# Patient Record
Sex: FEMALE | Race: WHITE | NOT HISPANIC OR LATINO | Employment: OTHER | URBAN - METROPOLITAN AREA
[De-identification: names, ages, dates, MRNs, and addresses within clinical notes are randomized per-mention and may not be internally consistent; named-entity substitution may affect disease eponyms.]

---

## 2020-07-31 PROCEDURE — U0003 INFECTIOUS AGENT DETECTION BY NUCLEIC ACID (DNA OR RNA); SEVERE ACUTE RESPIRATORY SYNDROME CORONAVIRUS 2 (SARS-COV-2) (CORONAVIRUS DISEASE [COVID-19]), AMPLIFIED PROBE TECHNIQUE, MAKING USE OF HIGH THROUGHPUT TECHNOLOGIES AS DESCRIBED BY CMS-2020-01-R: HCPCS | Performed by: PERSONAL EMERGENCY RESPONSE ATTENDANT

## 2020-08-03 ENCOUNTER — TELEPHONE (OUTPATIENT)
Dept: URGENT CARE | Facility: CLINIC | Age: 25
End: 2020-08-03

## 2021-02-13 ENCOUNTER — APPOINTMENT (OUTPATIENT)
Dept: ULTRASOUND IMAGING | Facility: HOSPITAL | Age: 26
End: 2021-02-13

## 2021-02-13 ENCOUNTER — HOSPITAL ENCOUNTER (EMERGENCY)
Facility: HOSPITAL | Age: 26
Discharge: HOME | End: 2021-02-13
Attending: EMERGENCY MEDICINE | Admitting: EMERGENCY MEDICINE

## 2021-02-13 ENCOUNTER — APPOINTMENT (OUTPATIENT)
Dept: CT IMAGING | Facility: HOSPITAL | Age: 26
End: 2021-02-13

## 2021-02-13 VITALS
SYSTOLIC BLOOD PRESSURE: 102 MMHG | HEART RATE: 66 BPM | RESPIRATION RATE: 16 BRPM | HEIGHT: 65 IN | WEIGHT: 123 LBS | OXYGEN SATURATION: 100 % | BODY MASS INDEX: 20.49 KG/M2 | TEMPERATURE: 98.8 F | DIASTOLIC BLOOD PRESSURE: 64 MMHG

## 2021-02-13 DIAGNOSIS — R10.2 PELVIC PAIN: Primary | ICD-10-CM

## 2021-02-13 DIAGNOSIS — R10.84 GENERALIZED POSTPRANDIAL ABDOMINAL PAIN: ICD-10-CM

## 2021-02-13 DIAGNOSIS — R91.8 LUNG NODULES: ICD-10-CM

## 2021-02-13 LAB
ALBUMIN SERPL-MCNC: 5.1 G/DL (ref 3.5–5.2)
ALBUMIN/GLOB SERPL: 2 G/DL
ALP SERPL-CCNC: 65 U/L (ref 39–117)
ALT SERPL W P-5'-P-CCNC: 9 U/L (ref 1–33)
ANION GAP SERPL CALCULATED.3IONS-SCNC: 7 MMOL/L (ref 5–15)
AST SERPL-CCNC: 17 U/L (ref 1–32)
B-HCG UR QL: NEGATIVE
BASOPHILS # BLD AUTO: 0.07 10*3/MM3 (ref 0–0.2)
BASOPHILS NFR BLD AUTO: 1.2 % (ref 0–1.5)
BILIRUB SERPL-MCNC: 0.5 MG/DL (ref 0–1.2)
BILIRUB UR QL STRIP: NEGATIVE
BUN SERPL-MCNC: 13 MG/DL (ref 6–20)
BUN/CREAT SERPL: 15.3 (ref 7–25)
CALCIUM SPEC-SCNC: 9.3 MG/DL (ref 8.6–10.5)
CHLORIDE SERPL-SCNC: 103 MMOL/L (ref 98–107)
CLARITY UR: CLEAR
CO2 SERPL-SCNC: 28 MMOL/L (ref 22–29)
COLOR UR: YELLOW
CREAT SERPL-MCNC: 0.85 MG/DL (ref 0.57–1)
DEPRECATED RDW RBC AUTO: 40.2 FL (ref 37–54)
EOSINOPHIL # BLD AUTO: 0.26 10*3/MM3 (ref 0–0.4)
EOSINOPHIL NFR BLD AUTO: 4.3 % (ref 0.3–6.2)
ERYTHROCYTE [DISTWIDTH] IN BLOOD BY AUTOMATED COUNT: 11.8 % (ref 12.3–15.4)
GFR SERPL CREATININE-BSD FRML MDRD: 83 ML/MIN/1.73
GLOBULIN UR ELPH-MCNC: 2.5 GM/DL
GLUCOSE SERPL-MCNC: 94 MG/DL (ref 65–99)
GLUCOSE UR STRIP-MCNC: NEGATIVE MG/DL
HCT VFR BLD AUTO: 42.3 % (ref 34–46.6)
HGB BLD-MCNC: 13.8 G/DL (ref 12–15.9)
HGB UR QL STRIP.AUTO: NEGATIVE
HOLD SPECIMEN: NORMAL
IMM GRANULOCYTES # BLD AUTO: 0.01 10*3/MM3 (ref 0–0.05)
IMM GRANULOCYTES NFR BLD AUTO: 0.2 % (ref 0–0.5)
INTERNAL NEGATIVE CONTROL: NEGATIVE
INTERNAL POSITIVE CONTROL: POSITIVE
KETONES UR QL STRIP: NEGATIVE
LEUKOCYTE ESTERASE UR QL STRIP.AUTO: NEGATIVE
LIPASE SERPL-CCNC: 19 U/L (ref 13–60)
LYMPHOCYTES # BLD AUTO: 2 10*3/MM3 (ref 0.7–3.1)
LYMPHOCYTES NFR BLD AUTO: 33.1 % (ref 19.6–45.3)
Lab: NORMAL
MCH RBC QN AUTO: 30.3 PG (ref 26.6–33)
MCHC RBC AUTO-ENTMCNC: 32.6 G/DL (ref 31.5–35.7)
MCV RBC AUTO: 93 FL (ref 79–97)
MONOCYTES # BLD AUTO: 0.61 10*3/MM3 (ref 0.1–0.9)
MONOCYTES NFR BLD AUTO: 10.1 % (ref 5–12)
NEUTROPHILS NFR BLD AUTO: 3.1 10*3/MM3 (ref 1.7–7)
NEUTROPHILS NFR BLD AUTO: 51.1 % (ref 42.7–76)
NITRITE UR QL STRIP: NEGATIVE
NRBC BLD AUTO-RTO: 0 /100 WBC (ref 0–0.2)
PH UR STRIP.AUTO: 6.5 [PH] (ref 5–8)
PLATELET # BLD AUTO: 273 10*3/MM3 (ref 140–450)
PMV BLD AUTO: 10.4 FL (ref 6–12)
POTASSIUM SERPL-SCNC: 3.8 MMOL/L (ref 3.5–5.2)
PROT SERPL-MCNC: 7.6 G/DL (ref 6–8.5)
PROT UR QL STRIP: NEGATIVE
RBC # BLD AUTO: 4.55 10*6/MM3 (ref 3.77–5.28)
SODIUM SERPL-SCNC: 138 MMOL/L (ref 136–145)
SP GR UR STRIP: 1.01 (ref 1–1.03)
UROBILINOGEN UR QL STRIP: NORMAL
WBC # BLD AUTO: 6.05 10*3/MM3 (ref 3.4–10.8)
WHOLE BLOOD HOLD SPECIMEN: NORMAL
WHOLE BLOOD HOLD SPECIMEN: NORMAL

## 2021-02-13 PROCEDURE — 81003 URINALYSIS AUTO W/O SCOPE: CPT

## 2021-02-13 PROCEDURE — 74176 CT ABD & PELVIS W/O CONTRAST: CPT

## 2021-02-13 PROCEDURE — 81025 URINE PREGNANCY TEST: CPT | Performed by: EMERGENCY MEDICINE

## 2021-02-13 PROCEDURE — 80053 COMPREHEN METABOLIC PANEL: CPT

## 2021-02-13 PROCEDURE — 85025 COMPLETE CBC W/AUTO DIFF WBC: CPT

## 2021-02-13 PROCEDURE — 99283 EMERGENCY DEPT VISIT LOW MDM: CPT

## 2021-02-13 PROCEDURE — 81025 URINE PREGNANCY TEST: CPT

## 2021-02-13 PROCEDURE — 83690 ASSAY OF LIPASE: CPT

## 2021-02-13 RX ORDER — SODIUM CHLORIDE 9 MG/ML
10 INJECTION INTRAVENOUS AS NEEDED
Status: DISCONTINUED | OUTPATIENT
Start: 2021-02-13 | End: 2021-02-13 | Stop reason: HOSPADM

## 2021-02-13 NOTE — ED PROVIDER NOTES
Subjective   Ms. Jha is a 24 yo female who presents with two days of pelvic pain. She woke up with severe pain both groins yesterday with quick movement of pain to the right groin. The pain was bad enough to double her over, get a little sweaty, faint, and nauseated. The severe pain subsided quickly but ever since she has been having waxing and waning pain in her lower abdomen. She is a virgin, has no history of ovarian cysts, and is currently mid-cycle. She has not had a pain like this before. She hasn't taken anything for it. It is worse with straining to move bowels and she has pain with urination. In addition, for the last 1-2 weeks she has been having higher abdominal pain that she describes as burning. It tends to flare when she eats something. Although she has had GI issues previously, she has found that when she eats a good diet, she has little in the way of GI problems.      History provided by:  Patient      Review of Systems   Constitutional: Negative.  Negative for fever.   HENT: Negative.    Respiratory: Negative.  Negative for shortness of breath.    Cardiovascular: Negative.  Negative for chest pain.   Gastrointestinal: Positive for abdominal pain. Negative for rectal pain.   Genitourinary: Positive for pelvic pain. Negative for menstrual problem.   Musculoskeletal: Positive for back pain.   Neurological: Negative.    Psychiatric/Behavioral: Negative.    All other systems reviewed and are negative.      History reviewed. No pertinent past medical history.    Allergies   Allergen Reactions   • Acetaminophen Nausea And Vomiting   • Aspirin Hives       History reviewed. No pertinent surgical history.    History reviewed. No pertinent family history.    Social History     Socioeconomic History   • Marital status: Single     Spouse name: Not on file   • Number of children: Not on file   • Years of education: Not on file   • Highest education level: Not on file           Objective   Physical  Exam  Vitals signs and nursing note reviewed.   Constitutional:       General: She is not in acute distress.     Appearance: She is normal weight.      Comments: Slender, pleasant, young adult female, moves easily, no outward physical distress.   HENT:      Head: Atraumatic.      Mouth/Throat:      Comments: Airway patent  Eyes:      Conjunctiva/sclera: Conjunctivae normal.   Neck:      Musculoskeletal: Neck supple.      Trachea: Phonation normal.   Cardiovascular:      Rate and Rhythm: Normal rate and regular rhythm.      Heart sounds: Normal heart sounds.   Pulmonary:      Effort: Pulmonary effort is normal. No respiratory distress.      Breath sounds: Normal breath sounds.   Abdominal:      Palpations: Abdomen is soft. There is no mass.      Tenderness: There is no guarding.      Comments: Tenderness most everywhere but not that bad. RLQ more tender than elsewhere.   Musculoskeletal:         General: Tenderness (across mid-back) present.      Right lower leg: No edema.      Left lower leg: No edema.   Skin:     General: Skin is warm and dry.   Neurological:      Mental Status: She is alert and oriented to person, place, and time.   Psychiatric:         Mood and Affect: Mood normal.         Behavior: Behavior normal.         Procedures           ED Course  ED Course as of Feb 13 2104   Sat Feb 13, 2021 1910 We discussed pelvic ultrasound which she didn't like the idea of and CT scan. CT not as good for pelvic pathology but she prefers to have an idea of gynecologic vs abdominal pathology before she leaves the ED.    [LI]   2035 Her new pelvic pain certainly could have been ovulatory with a small splash of fluid now nearly reabsorbed. She also, though, has been having a lot of what are obviously GI problems. She is OK with the workup, doesn't request any pain meds, but will be referred to lung nodule clinic as well as [facility] P[facility].    [LI]      ED Course User Index  [LI] Noe Pack MD      Recent Results  (from the past 24 hour(s))   Comprehensive Metabolic Panel    Collection Time: 02/13/21  4:54 PM    Specimen: Blood   Result Value Ref Range    Glucose 94 65 - 99 mg/dL    BUN 13 6 - 20 mg/dL    Creatinine 0.85 0.57 - 1.00 mg/dL    Sodium 138 136 - 145 mmol/L    Potassium 3.8 3.5 - 5.2 mmol/L    Chloride 103 98 - 107 mmol/L    CO2 28.0 22.0 - 29.0 mmol/L    Calcium 9.3 8.6 - 10.5 mg/dL    Total Protein 7.6 6.0 - 8.5 g/dL    Albumin 5.10 3.50 - 5.20 g/dL    ALT (SGPT) 9 1 - 33 U/L    AST (SGOT) 17 1 - 32 U/L    Alkaline Phosphatase 65 39 - 117 U/L    Total Bilirubin 0.5 0.0 - 1.2 mg/dL    eGFR Non African Amer 83 >60 mL/min/1.73    Globulin 2.5 gm/dL    A/G Ratio 2.0 g/dL    BUN/Creatinine Ratio 15.3 7.0 - 25.0    Anion Gap 7.0 5.0 - 15.0 mmol/L   Lipase    Collection Time: 02/13/21  4:54 PM    Specimen: Blood   Result Value Ref Range    Lipase 19 13 - 60 U/L   Green Top (Gel)    Collection Time: 02/13/21  4:54 PM   Result Value Ref Range    Extra Tube Hold for add-ons.    Lavender Top    Collection Time: 02/13/21  4:54 PM   Result Value Ref Range    Extra Tube hold for add-on    Gold Top - SST    Collection Time: 02/13/21  4:54 PM   Result Value Ref Range    Extra Tube Hold for add-ons.    Gray Top - Ice    Collection Time: 02/13/21  4:54 PM   Result Value Ref Range    Extra Tube Hold for add-ons.    CBC Auto Differential    Collection Time: 02/13/21  4:54 PM    Specimen: Blood   Result Value Ref Range    WBC 6.05 3.40 - 10.80 10*3/mm3    RBC 4.55 3.77 - 5.28 10*6/mm3    Hemoglobin 13.8 12.0 - 15.9 g/dL    Hematocrit 42.3 34.0 - 46.6 %    MCV 93.0 79.0 - 97.0 fL    MCH 30.3 26.6 - 33.0 pg    MCHC 32.6 31.5 - 35.7 g/dL    RDW 11.8 (L) 12.3 - 15.4 %    RDW-SD 40.2 37.0 - 54.0 fl    MPV 10.4 6.0 - 12.0 fL    Platelets 273 140 - 450 10*3/mm3    Neutrophil % 51.1 42.7 - 76.0 %    Lymphocyte % 33.1 19.6 - 45.3 %    Monocyte % 10.1 5.0 - 12.0 %    Eosinophil % 4.3 0.3 - 6.2 %    Basophil % 1.2 0.0 - 1.5 %    Immature Grans  % 0.2 0.0 - 0.5 %    Neutrophils, Absolute 3.10 1.70 - 7.00 10*3/mm3    Lymphocytes, Absolute 2.00 0.70 - 3.10 10*3/mm3    Monocytes, Absolute 0.61 0.10 - 0.90 10*3/mm3    Eosinophils, Absolute 0.26 0.00 - 0.40 10*3/mm3    Basophils, Absolute 0.07 0.00 - 0.20 10*3/mm3    Immature Grans, Absolute 0.01 0.00 - 0.05 10*3/mm3    nRBC 0.0 0.0 - 0.2 /100 WBC   Urinalysis With Microscopic If Indicated (No Culture) - Urine, Clean Catch    Collection Time: 02/13/21  4:55 PM    Specimen: Urine, Clean Catch   Result Value Ref Range    Color, UA Yellow Yellow, Straw    Appearance, UA Clear Clear    pH, UA 6.5 5.0 - 8.0    Specific Gravity, UA 1.007 1.001 - 1.030    Glucose, UA Negative Negative    Ketones, UA Negative Negative    Bilirubin, UA Negative Negative    Blood, UA Negative Negative    Protein, UA Negative Negative    Leuk Esterase, UA Negative Negative    Nitrite, UA Negative Negative    Urobilinogen, UA 0.2 E.U./dL 0.2 - 1.0 E.U./dL   Light Blue Top    Collection Time: 02/13/21  4:55 PM   Result Value Ref Range    Extra Tube hold for add-on    POC Pregnancy, Urine    Collection Time: 02/13/21  4:59 PM    Specimen: Urine   Result Value Ref Range    HCG, Urine, QL Negative Negative    Lot Number [id number]     Internal Positive Control Positive     Internal Negative Control Negative      Note: In addition to lab results from this visit, the labs listed above may include labs taken at another facility or during a different encounter within the last 24 hours. Please correlate lab times with ED admission and discharge times for further clarification of the services performed during this visit.    CT Abdomen Pelvis Without Contrast   Final Result   No acute intra-abdominal process. Trace amount of free fluid in the pelvis is likely physiologic.             Signer Name: Zora Serrato MD    Signed: 2/13/2021 7:53 PM    Workstation Name: [facility]     [facility]        Vitals:    02/13/21 0288  "  BP: 102/64   BP Location: Left arm   Patient Position: Sitting   Pulse: 66   Resp: 16   Temp: 98.8 °F (37.1 °C)   TempSrc: Oral   SpO2: 100%   Weight: 55.8 kg (123 lb)   Height: 165.1 cm (65\")     Medications   Sodium Chloride (PF) 0.9 % 10 mL (has no administration in time range)     ECG/EMG Results (last 24 hours)     ** No results found for the last 24 hours. **        No orders to display                                              MDM    Final diagnoses:   Pelvic pain   Generalized postprandial abdominal pain   Lung nodules            Noe Pack MD  02/13/21 0442    "

## 2021-02-14 NOTE — DISCHARGE INSTRUCTIONS
Be careful to eat a healthy diet.  You need follow-up medical care. Contact one of the [facility] practices listed:    Follow up with one of the [facility] Primary Care Providers below to setup primary care. If you need assistance coordinating a primary care appointment with a [facility] Primary Care Provider, please contact the Primary Care Coordinators at [phone] for appointment scheduling.    [facility], Primary Care   [address], [address]   [place], [place] [address] [phone]    [facility] Internal Medicine & Endocrinology  [address], [address]  [place] [address] [phone]    [facility] Family Medicine  [address], [address]   [place] [address] [phone]    [facility] Primary Care  [address], [address]  [place], [place] [address] [phone]    [facility], Primary Care,   [address], [address]   [place], [place] [address] [phone]    [facility] Primary Care  [address]  [place], [place] [id number] [phone]    [facility], Primary Care  [address], [address]  [place], [place] [address] [phone]    [facility] Internal Medicine & Pediatrics  [address], [address]   [place] [address] [phone]    [facility], Primary Care  [address] [address] [phone]      [facility] Primary Care  [address], [address]   [place] [address] [phone]    [facility] Family Medicine  [address] [place] [address] [phone]

## 2021-06-22 ENCOUNTER — OFFICE VISIT (OUTPATIENT)
Dept: FAMILY MEDICINE CLINIC | Facility: CLINIC | Age: 26
End: 2021-06-22

## 2021-06-22 ENCOUNTER — HOSPITAL ENCOUNTER (OUTPATIENT)
Dept: GENERAL RADIOLOGY | Facility: HOSPITAL | Age: 26
Discharge: HOME | End: 2021-06-22
Admitting: NURSE PRACTITIONER

## 2021-06-22 VITALS
BODY MASS INDEX: 20.83 KG/M2 | HEIGHT: 65 IN | SYSTOLIC BLOOD PRESSURE: 108 MMHG | WEIGHT: 125 LBS | DIASTOLIC BLOOD PRESSURE: 62 MMHG | TEMPERATURE: 98.2 F | HEART RATE: 79 BPM | OXYGEN SATURATION: 99 %

## 2021-06-22 DIAGNOSIS — N83.202 CYSTS OF BOTH OVARIES: ICD-10-CM

## 2021-06-22 DIAGNOSIS — G43.109 MIGRAINE WITH AURA AND WITHOUT STATUS MIGRAINOSUS, NOT INTRACTABLE: ICD-10-CM

## 2021-06-22 DIAGNOSIS — M79.671 RIGHT FOOT PAIN: ICD-10-CM

## 2021-06-22 DIAGNOSIS — N83.201 CYSTS OF BOTH OVARIES: ICD-10-CM

## 2021-06-22 DIAGNOSIS — M79.671 RIGHT FOOT PAIN: Primary | ICD-10-CM

## 2021-06-22 PROCEDURE — 99214 OFFICE O/P EST MOD 30 MIN: CPT | Performed by: NURSE PRACTITIONER

## 2021-06-22 PROCEDURE — 73630 X-RAY EXAM OF FOOT: CPT

## 2021-06-22 RX ORDER — SUMATRIPTAN 25 MG/1
25 TABLET, FILM COATED ORAL
Qty: 9 TABLET | Refills: 3 | Status: SHIPPED | OUTPATIENT
Start: 2021-06-22 | End: 2021-09-27 | Stop reason: SDUPTHER

## 2021-06-22 NOTE — PROGRESS NOTES
Destiney Jha presents today to establish care.    Establish Care (Pt is here to establish care. She states she was going to [facility] in [place] but hasn't been there in about 2 years. ) and Foot Pain (Pt states she has been having right foot pain for about 2 years but has gotten worse in the last 6 months. She states she noticed a knot on her foot that she thinks is a bunyon about a year ago but it has gotten bigger in the last 6 months.)       HPI   Pt is here to establish care.  She has a PMH of migraines.  She does still have issues with these, but they are worse during school d/t stress.      She went to the hospital recently for cysts on her ovaries.  This is just a recent development.  It has started becoming a monthly issue for her.  Her sister has this issue also.  She took birth control when she was younger, but she didn't like it, because it messed with her.      She has been having right foot pain for about 2 years, but started worsening over the last 6 months.  She has noticed that the longer she walks on it, the worse it gets.  She denies any injuries.  When it first started it would still only hurt if she walked on it.  It would throb and she thought it was her shoes, but it didn't matter what shoes she wore.  The pain started radiating into her toes and it didn't matter what she did.  The pain has been escalating.  She has felt like there is slight swelling.  She denies redness or heat.  Massaging kind of helps it to feel better.  She has not taken any medication for it.  Her mom has issues with bunions and poor circulation.  Pt feels like she has poor circulation also d/t her feet staying cold.      Past Medical History:   Diagnosis Date   • Headache         History reviewed. No pertinent surgical history.     History reviewed. No pertinent family history.     Social History     Socioeconomic History   • Marital status: Single     Spouse name: Not on file   • Number of children: Not on  "file   • Years of education: Not on file   • Highest education level: Not on file   Tobacco Use   • Smoking status: Never Smoker   • Smokeless tobacco: Never Used   Substance and Sexual Activity   • Alcohol use: Yes     Comment: social         No current outpatient medications on file prior to visit.     No current facility-administered medications on file prior to visit.       Allergies   Allergen Reactions   • Acetaminophen Nausea And Vomiting   • Aspirin Hives        Vitals:    06/22/21 0911   BP: 108/62   Pulse: 79   Temp: 98.2 °F (36.8 °C)   SpO2: 99%   Weight: 56.7 kg (125 lb)   Height: 165.1 cm (65\")   PainSc: 0-No pain        Physical Exam  Vitals reviewed.   Constitutional:       Appearance: Normal appearance. She is normal weight.   HENT:      Head: Normocephalic and atraumatic.   Cardiovascular:      Rate and Rhythm: Normal rate and regular rhythm.      Heart sounds: Normal heart sounds.   Pulmonary:      Effort: Pulmonary effort is normal.      Breath sounds: Normal breath sounds.   Abdominal:      General: Bowel sounds are normal.      Palpations: Abdomen is soft.   Musculoskeletal:        Feet:    Feet:      Comments: Painful areas indicated above.  Skin:     General: Skin is warm and dry.   Neurological:      General: No focal deficit present.      Mental Status: She is alert and oriented to person, place, and time.   Psychiatric:         Mood and Affect: Mood normal.         Behavior: Behavior normal.         Thought Content: Thought content normal.         Judgment: Judgment normal.            Diagnoses and all orders for this visit:    1. Right foot pain (Primary) -this pain has been going on for 2 years.  She denies any injury.  Will get x-ray today for further evaluation.  --Referral to Ortho.  --Discussed RICE.  --Can use Tylenol or ibuprofen as needed for pain.  --Can use heat as needed.  --If symptoms worsen or do not improve, return to clinic.  -     XR Foot 3+ View Right; Future  -     " Ambulatory Referral to Orthopedic Surgery    2. Cysts of both ovaries -discussed referral to gynecology, but patient defers at present.  She has taken birth control in the past and did not like the way it made her feel.    3. Migraine with aura and without status migrainosus, not intractable -she has intermittent issues with migraines.  They are worse when she is in school due to stress.  --Refill Imitrex for as needed use.  -     SUMAtriptan (IMITREX) 25 MG tablet; Take 1 tablet by mouth Every 2 (Two) Hours As Needed for Migraine. Max dose 200 mg per day  Dispense: 9 tablet; Refill: 3    Return in about 4 weeks (around 7/20/2021) for Annual physical.    Jazmine Garza, APRN

## 2021-07-26 ENCOUNTER — OFFICE VISIT (OUTPATIENT)
Dept: ORTHOPEDIC SURGERY | Facility: CLINIC | Age: 26
End: 2021-07-26

## 2021-07-26 VITALS
WEIGHT: 130 LBS | HEIGHT: 65 IN | HEART RATE: 93 BPM | SYSTOLIC BLOOD PRESSURE: 134 MMHG | BODY MASS INDEX: 21.66 KG/M2 | DIASTOLIC BLOOD PRESSURE: 72 MMHG

## 2021-07-26 DIAGNOSIS — M21.6X1 ACQUIRED METATARSUS VARUS OF RIGHT FOOT: Primary | ICD-10-CM

## 2021-07-26 PROCEDURE — 99203 OFFICE O/P NEW LOW 30 MIN: CPT | Performed by: ORTHOPAEDIC SURGERY

## 2021-07-26 NOTE — PROGRESS NOTES
NEW PATIENT    Patient: Destiney Jha  : 1995    Primary Care Provider: Jazmine Garza APRN    Requesting Provider: As above    Pain of the Right Foot      History    Chief Complaint: Right foot pain    History of Present Illness: This is a very pleasant 25-year-old with a 2-year history of right great toe pain.  It hurts with pressure.  Sometimes words barefoot.  It is gotten worse over the past year.  She rates the pain as 5-8 out of 10.  It sometimes radiates proximal and distal along the dorsal medial digital nerve.  She has a positive family history of bunions.  She has some mild ligamentous laxity.  No history of injury.  She has not had any treatment as yet.    Current Outpatient Medications on File Prior to Visit   Medication Sig Dispense Refill   • SUMAtriptan (IMITREX) 25 MG tablet Take 1 tablet by mouth Every 2 (Two) Hours As Needed for Migraine. Max dose 200 mg per day 9 tablet 3     No current facility-administered medications on file prior to visit.      Allergies   Allergen Reactions   • Acetaminophen Nausea And Vomiting   • Aspirin Hives      Past Medical History:   Diagnosis Date   • Headache      History reviewed. No pertinent surgical history.  Family History   Problem Relation Age of Onset   • No Known Problems Mother    • No Known Problems Father       Social History     Socioeconomic History   • Marital status: Single     Spouse name: Not on file   • Number of children: Not on file   • Years of education: Not on file   • Highest education level: Not on file   Tobacco Use   • Smoking status: Never Smoker   • Smokeless tobacco: Never Used   Vaping Use   • Vaping Use: Never used   Substance and Sexual Activity   • Alcohol use: Yes     Comment: social    • Drug use: Never   • Sexual activity: Defer        Review of Systems   Constitutional: Negative.    HENT: Negative.    Eyes: Negative.    Respiratory: Negative.    Cardiovascular: Negative.    Gastrointestinal: Negative.    Endocrine:  "Negative.    Genitourinary: Negative.    Musculoskeletal: Positive for arthralgias.   Skin: Negative.    Allergic/Immunologic: Negative.    Neurological: Negative.    Hematological: Negative.    Psychiatric/Behavioral: Negative.        The following portions of the patient's history were reviewed and updated as appropriate: allergies, current medications, past family history, past medical history, past social history, past surgical history and problem list.    Physical Exam:   /72   Pulse 93   Ht 165.1 cm (65\")   Wt 59 kg (130 lb)   BMI 21.63 kg/m²   GENERAL: Body habitus: normal weight for height    Lower extremity edema: Right: none; Left: none    Varicose veins:  Right: none; Left: none    Gait: normal     Mental Status:  awake and alert; oriented to person, place, and time    Voice:  clear  SKIN:  Lower extremity: Normal    Hair Growth(lower extremity):  Right:normal; Left:  normal  NAILS: Toenails: normal  HEENT: Head: Normocephalic, atraumatic,  without obvious abnormality.  eye: normal external eye, no icterus  ears:normal external ears  PULM:  Repiratory effort normal  CV:  Dorsalis Pedis:  Right: 2+; Left:2+    Posterior Tibial: Right:2+; Left:2+    Capillary Refill:  Brisk  MSK:  Hand:right handed and Slight ligamentous laxity      Tibia:  Right:  non tender; Left:  non tender      Ankle:  Right: non tender, ROM  normal and motor function  normal; Left:  Nontender, normal range of motion      Foot:  Right:  Mildly tender over the bunion, no pain with a grind test, no significant laxity, normal arch, no other tenderness, great toe has 40 degrees of dorsiflexion plantarflexion; Left:  non tender, ROM  normal and motor function  normal      NEURO: Heel Walking:  Right:  normal; Left:  normal    Toe Walking:  Right:  normal; Left:  normal     Coila-Juan Pablo 5.07 monofilament test: normal    Lower extremity sensation: intact     Reflexes:  Biceps:  Right:  not tested; Left:  not tested           " Quads:  Right:  not tested; Left:  not tested           Ankle:  Right:  not tested; Left:  not tested      Calf Atrophy:none    Motor Function: all 5/5         Medical Decision Making    Data Review:   ordered and reviewed x-rays today    Assessment and Plan/ Diagnosis/Treatment options:   1. Acquired metatarsus varus of right foot  She has mild to moderate hallux valgus metatarsus primus varus on the right.  We discussed bunions in detail.  Hallux valgus, metatarsus primus varus and painful bunion.  I explained the mechanical nature of the problem.  I explained that the bunion is not a growth on the foot but rather it is a widening of the angle between the first and second metatarsals.  I drew a picture on the foot and explained the problem in detail.  I explained that bunions are a result of heredity and shoe wear.  The reason they hurt is from shoes pushing on the bunion.      I explained that first line treatment is conservative.  Wide, round toed shoes  can help them be comfortable.  Sometimes custom orthotics can help if they also have metatarsalgia.  I explained that there is no brace that works to change the bunion angle.      If conservative treatment does not help the pain enough then the patient might consider surgery. Pain is the only indication for surgery.   I advised them that I never talk anyone into bunion surgery, only the patient can decide when or if the pain is enough to undergo surgery.      Bunion surgery helps pain.  The [employer] did a reasearch study a number of years ago and it showed that at ONE YEAR after surgery, 90% of patients were happy with the pain relief and the results.     It does not change the appearance of the foot very much, and it does not change shoe size.  The toe is permanently somewhat stiffer after surgery.  The surgery has a long recovery time, and all foot and ankle surgery swells longer than any other type of surgery.  The reason  the swelling is so prolonged is gravity- the foot is below the heart and thus it swells.  The swelling can last for months, sometimes a year.  Swelling will be more prolonged in a patient who has a condition like venous stasis or chronic edema.    Given that she has not had any treatment I would recommend we start with conservative treatment.  I would recommend wider shoes, we discussed various brands.  I would also recommend custom orthotics.  She would like to try to avoid surgery.  I think the shoe change will help.  I will be happy to see her anytime            Radiology Ordered []  Radiology Reports Reviewed []      Radiology Images Reviewed []   Labs Reviewed []    Labs Ordered []   PCP Records Reviewed []    Provider Records Reviewed []    ER Records Reviewed []    Hospital Records Reviewed []    History Obtained From Family []    Phone conversation with Provider []    Records Requested []        Dina Liu MD

## 2021-09-27 ENCOUNTER — OFFICE VISIT (OUTPATIENT)
Dept: FAMILY MEDICINE CLINIC | Facility: CLINIC | Age: 26
End: 2021-09-27

## 2021-09-27 VITALS
BODY MASS INDEX: 20.53 KG/M2 | SYSTOLIC BLOOD PRESSURE: 110 MMHG | RESPIRATION RATE: 16 BRPM | OXYGEN SATURATION: 99 % | DIASTOLIC BLOOD PRESSURE: 70 MMHG | WEIGHT: 123.2 LBS | HEIGHT: 65 IN | HEART RATE: 68 BPM

## 2021-09-27 DIAGNOSIS — Z00.00 ANNUAL PHYSICAL EXAM: Primary | ICD-10-CM

## 2021-09-27 DIAGNOSIS — G43.109 MIGRAINE WITH AURA AND WITHOUT STATUS MIGRAINOSUS, NOT INTRACTABLE: ICD-10-CM

## 2021-09-27 DIAGNOSIS — Z11.1 SCREENING-PULMONARY TB: ICD-10-CM

## 2021-09-27 PROCEDURE — 99395 PREV VISIT EST AGE 18-39: CPT | Performed by: NURSE PRACTITIONER

## 2021-09-27 RX ORDER — AMITRIPTYLINE HYDROCHLORIDE 25 MG/1
25 TABLET, FILM COATED ORAL NIGHTLY
Qty: 30 TABLET | Refills: 1 | Status: SHIPPED | OUTPATIENT
Start: 2021-09-27 | End: 2022-04-25

## 2021-09-27 RX ORDER — SUMATRIPTAN 50 MG/1
50 TABLET, FILM COATED ORAL
Qty: 9 TABLET | Refills: 5 | Status: SHIPPED | OUTPATIENT
Start: 2021-09-27 | End: 2022-10-20

## 2021-09-27 NOTE — PROGRESS NOTES
Destiney Jha presents today for a physical    Chief Complaint   Patient presents with   • Annual Exam   • Follow-up     migraine meds, wants increased        HPI   She has had a 3 day migraine spell.  She has been getting them more frequently.  She thinks it is stress related.  If she watches her diet and fluid intake, it helps.      She got [employer] and it has helped her feet a lot.  They recommended getting insoles for her shoes.  She will f/u with her as needed.      She is babysitting.  She is currently in school and will graduate mid-December.  Her major is dietetics.      She lives with her dad currently.      She typically exercises for 3-4 days per week, but with school is exercising 1-2 times week.  She exercises for 1-2 hours.      She eats healthy for the most part.      She doesn't smoke.  Drinks alcohol occasionally and no illegal drug use.    She hasn't had a dental cleaning in over 6 months.  She brushes her teeth twice a day and flosses.    She wears glasses and contacts.  She is UTD on her vision exam.      She checks her skin regularly.  She wears sunscreen on her face regularly.      She needs a TB skin test for school to shadow.     She is not sexually active.      Patient Active Problem List   Diagnosis   • Right foot pain   • Cysts of both ovaries       Current Outpatient Medications   Medication Sig Dispense Refill   • SUMAtriptan (IMITREX) 50 MG tablet Take 1 tablet by mouth Every 2 (Two) Hours As Needed for Migraine. Max dose 200 mg per day 9 tablet 5   • amitriptyline (ELAVIL) 25 MG tablet Take 1 tablet by mouth Every Night. 30 tablet 1     No current facility-administered medications for this visit.       Allergies   Allergen Reactions   • Acetaminophen Nausea And Vomiting   • Aspirin Hives        Past Medical History:   Diagnosis Date   • Headache         History reviewed. No pertinent surgical history.     Family History   Problem Relation Age of Onset   • No Known Problems Mother    • No  "Known Problems Father         Social History     Socioeconomic History   • Marital status: Single     Spouse name: Not on file   • Number of children: Not on file   • Years of education: Not on file   • Highest education level: Not on file   Tobacco Use   • Smoking status: Never Smoker   • Smokeless tobacco: Never Used   Vaping Use   • Vaping Use: Never used   Substance and Sexual Activity   • Alcohol use: Yes     Comment: social    • Drug use: Never   • Sexual activity: Defer        Vitals:    09/27/21 1313   BP: 110/70   Pulse: 68   Resp: 16   SpO2: 99%   Weight: 55.9 kg (123 lb 3.2 oz)   Height: 165.1 cm (65\")   PainSc: 0-No pain      Body mass index is 20.5 kg/m².    Patient's Body mass index is 20.5 kg/m². indicating that she is within normal range (BMI 18.5-24.9). No BMI management plan needed..      Physical Exam  Vitals reviewed.   Constitutional:       Appearance: Normal appearance. She is well-developed and normal weight.   HENT:      Head: Normocephalic and atraumatic.      Right Ear: Tympanic membrane, ear canal and external ear normal.      Left Ear: Tympanic membrane, ear canal and external ear normal.      Nose: Nose normal.      Mouth/Throat:      Mouth: Mucous membranes are moist.      Pharynx: Oropharynx is clear.   Eyes:      General: Lids are normal. Lids are everted, no foreign bodies appreciated.      Extraocular Movements: Extraocular movements intact.      Conjunctiva/sclera: Conjunctivae normal.      Pupils: Pupils are equal, round, and reactive to light.   Neck:      Thyroid: No thyroid mass or thyromegaly.      Vascular: No carotid bruit.      Trachea: Trachea normal.   Cardiovascular:      Rate and Rhythm: Normal rate and regular rhythm.      Pulses: Normal pulses.      Heart sounds: Normal heart sounds.   Pulmonary:      Effort: Pulmonary effort is normal.      Breath sounds: Normal breath sounds.   Abdominal:      General: Bowel sounds are normal.      Palpations: Abdomen is soft.      " Tenderness: There is no abdominal tenderness. There is no guarding or rebound.   Musculoskeletal:         General: Normal range of motion.      Cervical back: Normal range of motion and neck supple.      Comments: Strength 5/5 to BUE and BLE.   Skin:     General: Skin is warm and dry.   Neurological:      General: No focal deficit present.      Mental Status: She is alert and oriented to person, place, and time.      Deep Tendon Reflexes: Reflexes are normal and symmetric.      Comments: DTRs +2.  Romberg negative.   Psychiatric:         Mood and Affect: Mood normal.         Behavior: Behavior normal.         Thought Content: Thought content normal.         Judgment: Judgment normal.            Results for orders placed or performed during the hospital encounter of 02/13/21   Comprehensive Metabolic Panel    Specimen: Blood   Result Value Ref Range    Glucose 94 65 - 99 mg/dL    BUN 13 6 - 20 mg/dL    Creatinine 0.85 0.57 - 1.00 mg/dL    Sodium 138 136 - 145 mmol/L    Potassium 3.8 3.5 - 5.2 mmol/L    Chloride 103 98 - 107 mmol/L    CO2 28.0 22.0 - 29.0 mmol/L    Calcium 9.3 8.6 - 10.5 mg/dL    Total Protein 7.6 6.0 - 8.5 g/dL    Albumin 5.10 3.50 - 5.20 g/dL    ALT (SGPT) 9 1 - 33 U/L    AST (SGOT) 17 1 - 32 U/L    Alkaline Phosphatase 65 39 - 117 U/L    Total Bilirubin 0.5 0.0 - 1.2 mg/dL    eGFR Non African Amer 83 >60 mL/min/1.73    Globulin 2.5 gm/dL    A/G Ratio 2.0 g/dL    BUN/Creatinine Ratio 15.3 7.0 - 25.0    Anion Gap 7.0 5.0 - 15.0 mmol/L   Lipase    Specimen: Blood   Result Value Ref Range    Lipase 19 13 - 60 U/L   Urinalysis With Microscopic If Indicated (No Culture) - Urine, Clean Catch    Specimen: Urine, Clean Catch   Result Value Ref Range    Color, UA Yellow Yellow, Straw    Appearance, UA Clear Clear    pH, UA 6.5 5.0 - 8.0    Specific Gravity, UA 1.007 1.001 - 1.030    Glucose, UA Negative Negative    Ketones, UA Negative Negative    Bilirubin, UA Negative Negative    Blood, UA Negative  Negative    Protein, UA Negative Negative    Leuk Esterase, UA Negative Negative    Nitrite, UA Negative Negative    Urobilinogen, UA 0.2 E.U./dL 0.2 - 1.0 E.U./dL   Gray Top - Ice   Result Value Ref Range    Extra Tube Hold for add-ons.    CBC Auto Differential    Specimen: Blood   Result Value Ref Range    WBC 6.05 3.40 - 10.80 10*3/mm3    RBC 4.55 3.77 - 5.28 10*6/mm3    Hemoglobin 13.8 12.0 - 15.9 g/dL    Hematocrit 42.3 34.0 - 46.6 %    MCV 93.0 79.0 - 97.0 fL    MCH 30.3 26.6 - 33.0 pg    MCHC 32.6 31.5 - 35.7 g/dL    RDW 11.8 (L) 12.3 - 15.4 %    RDW-SD 40.2 37.0 - 54.0 fl    MPV 10.4 6.0 - 12.0 fL    Platelets 273 140 - 450 10*3/mm3    Neutrophil % 51.1 42.7 - 76.0 %    Lymphocyte % 33.1 19.6 - 45.3 %    Monocyte % 10.1 5.0 - 12.0 %    Eosinophil % 4.3 0.3 - 6.2 %    Basophil % 1.2 0.0 - 1.5 %    Immature Grans % 0.2 0.0 - 0.5 %    Neutrophils, Absolute 3.10 1.70 - 7.00 10*3/mm3    Lymphocytes, Absolute 2.00 0.70 - 3.10 10*3/mm3    Monocytes, Absolute 0.61 0.10 - 0.90 10*3/mm3    Eosinophils, Absolute 0.26 0.00 - 0.40 10*3/mm3    Basophils, Absolute 0.07 0.00 - 0.20 10*3/mm3    Immature Grans, Absolute 0.01 0.00 - 0.05 10*3/mm3    nRBC 0.0 0.0 - 0.2 /100 WBC   POC Pregnancy, Urine    Specimen: Urine   Result Value Ref Range    HCG, Urine, QL Negative Negative    Lot Number [id number]     Internal Positive Control Positive     Internal Negative Control Negative    Light Blue Top   Result Value Ref Range    Extra Tube hold for add-on    Green Top (Gel)   Result Value Ref Range    Extra Tube Hold for add-ons.    Lavender Top   Result Value Ref Range    Extra Tube hold for add-on    Gold Top - SST   Result Value Ref Range    Extra Tube Hold for add-ons.         Immunization History   Administered Date(s) Administered   • DTaP 02/14/1998, 03/17/1998, 04/17/1998, 01/13/1999, 09/24/2002   • Hep B, Unspecified 09/03/1998, 01/13/1999, 06/06/2002   • HiB 01/13/1999, 09/03/1999   • IPV 02/14/1998, 03/17/1998,  04/17/1998, 01/13/1999   • MMR 06/06/2000, 08/29/2002   • Meningococcal MCV4P (Menactra) 07/30/2009   • Tdap 07/30/2009       Health Maintenance   Topic Date Due   • CHLAMYDIA SCREENING  Never done   • TDAP/TD VACCINES (2 - Td or Tdap) 07/30/2019   • PAP SMEAR  11/14/2022 (Originally 7/24/2017)   • INFLUENZA VACCINE  10/01/2021       Diagnoses and all orders for this visit:    1. Annual physical exam (Primary) -exam unremarkable.      2. Migraine with aura and without status migrainosus, not intractable -patient continues to have migraines.  She had one for 3 days.  She thinks that in the past she took sumatriptan 50 mg and would like this increased.  Discussed prophylactic and abortive treatment.  Patient is agreeable to trying prophylactic.  --Start amitriptyline 25 mg at bedtime.  Discussed side effects and agrees to use.  If she is unable to tolerate this, she will let provider know.  --Increase sumatriptan to 50 mg for as needed use.  --If symptoms worsen or do not improve, RTC.  -     amitriptyline (ELAVIL) 25 MG tablet; Take 1 tablet by mouth Every Night.  Dispense: 30 tablet; Refill: 1  -     SUMAtriptan (IMITREX) 50 MG tablet; Take 1 tablet by mouth Every 2 (Two) Hours As Needed for Migraine. Max dose 200 mg per day  Dispense: 9 tablet; Refill: 5    3. Screening-pulmonary TB -clinic is currently out of solution for this.  Encouraged her to get this performed at [facility] at .          Counseled on health maintenance topics and preventative care recommendations: healthy eating (avoid sweets and excessive intake of carbs), routine physical activity (150 minutes/week of moderate intensity exercise), monthly skin exams, sunscreen use, eye exams, dental exams.     Return if symptoms worsen or fail to improve.    Jazmine Garza, APRN

## 2022-04-25 ENCOUNTER — OFFICE VISIT (OUTPATIENT)
Dept: FAMILY MEDICINE CLINIC | Facility: CLINIC | Age: 27
End: 2022-04-25

## 2022-04-25 ENCOUNTER — PATIENT ROUNDING (BHMG ONLY) (OUTPATIENT)
Dept: FAMILY MEDICINE CLINIC | Facility: CLINIC | Age: 27
End: 2022-04-25

## 2022-04-25 ENCOUNTER — LAB (OUTPATIENT)
Dept: LAB | Facility: HOSPITAL | Age: 27
End: 2022-04-25

## 2022-04-25 VITALS
OXYGEN SATURATION: 98 % | HEIGHT: 65 IN | DIASTOLIC BLOOD PRESSURE: 72 MMHG | TEMPERATURE: 98.2 F | BODY MASS INDEX: 21.46 KG/M2 | HEART RATE: 95 BPM | WEIGHT: 128.8 LBS | RESPIRATION RATE: 14 BRPM | SYSTOLIC BLOOD PRESSURE: 118 MMHG

## 2022-04-25 DIAGNOSIS — G43.909 MIGRAINE WITHOUT STATUS MIGRAINOSUS, NOT INTRACTABLE, UNSPECIFIED MIGRAINE TYPE: ICD-10-CM

## 2022-04-25 DIAGNOSIS — Z71.85 IMMUNIZATION COUNSELING: ICD-10-CM

## 2022-04-25 DIAGNOSIS — Z00.00 PREVENTATIVE HEALTH CARE: ICD-10-CM

## 2022-04-25 DIAGNOSIS — Z00.00 PREVENTATIVE HEALTH CARE: Primary | ICD-10-CM

## 2022-04-25 LAB
ALBUMIN SERPL-MCNC: 5.2 G/DL (ref 3.5–5.2)
ALBUMIN/GLOB SERPL: 2.2 G/DL
ALP SERPL-CCNC: 62 U/L (ref 39–117)
ALT SERPL W P-5'-P-CCNC: 14 U/L (ref 1–33)
ANION GAP SERPL CALCULATED.3IONS-SCNC: 10.2 MMOL/L (ref 5–15)
AST SERPL-CCNC: 17 U/L (ref 1–32)
BASOPHILS # BLD AUTO: 0.06 10*3/MM3 (ref 0–0.2)
BASOPHILS NFR BLD AUTO: 1.8 % (ref 0–1.5)
BILIRUB SERPL-MCNC: 0.5 MG/DL (ref 0–1.2)
BUN SERPL-MCNC: 10 MG/DL (ref 6–20)
BUN/CREAT SERPL: 11.4 (ref 7–25)
CALCIUM SPEC-SCNC: 9.6 MG/DL (ref 8.6–10.5)
CHLORIDE SERPL-SCNC: 103 MMOL/L (ref 98–107)
CO2 SERPL-SCNC: 23.8 MMOL/L (ref 22–29)
CREAT SERPL-MCNC: 0.88 MG/DL (ref 0.57–1)
DEPRECATED RDW RBC AUTO: 39.8 FL (ref 37–54)
EGFRCR SERPLBLD CKD-EPI 2021: 94.2 ML/MIN/1.73
EOSINOPHIL # BLD AUTO: 0.17 10*3/MM3 (ref 0–0.4)
EOSINOPHIL NFR BLD AUTO: 5.1 % (ref 0.3–6.2)
ERYTHROCYTE [DISTWIDTH] IN BLOOD BY AUTOMATED COUNT: 11.8 % (ref 12.3–15.4)
GLOBULIN UR ELPH-MCNC: 2.4 GM/DL
GLUCOSE SERPL-MCNC: 81 MG/DL (ref 65–99)
HBV SURFACE AB SER RIA-ACNC: REACTIVE
HBV SURFACE AG SERPL QL IA: NORMAL
HCT VFR BLD AUTO: 41.2 % (ref 34–46.6)
HGB BLD-MCNC: 13.5 G/DL (ref 12–15.9)
IMM GRANULOCYTES # BLD AUTO: 0.01 10*3/MM3 (ref 0–0.05)
IMM GRANULOCYTES NFR BLD AUTO: 0.3 % (ref 0–0.5)
LYMPHOCYTES # BLD AUTO: 1.41 10*3/MM3 (ref 0.7–3.1)
LYMPHOCYTES NFR BLD AUTO: 42.2 % (ref 19.6–45.3)
MCH RBC QN AUTO: 30.1 PG (ref 26.6–33)
MCHC RBC AUTO-ENTMCNC: 32.8 G/DL (ref 31.5–35.7)
MCV RBC AUTO: 92 FL (ref 79–97)
MONOCYTES # BLD AUTO: 0.38 10*3/MM3 (ref 0.1–0.9)
MONOCYTES NFR BLD AUTO: 11.4 % (ref 5–12)
NEUTROPHILS NFR BLD AUTO: 1.31 10*3/MM3 (ref 1.7–7)
NEUTROPHILS NFR BLD AUTO: 39.2 % (ref 42.7–76)
NRBC BLD AUTO-RTO: 0 /100 WBC (ref 0–0.2)
PLATELET # BLD AUTO: 230 10*3/MM3 (ref 140–450)
PMV BLD AUTO: 11.3 FL (ref 6–12)
POTASSIUM SERPL-SCNC: 4.8 MMOL/L (ref 3.5–5.2)
PROT SERPL-MCNC: 7.6 G/DL (ref 6–8.5)
RBC # BLD AUTO: 4.48 10*6/MM3 (ref 3.77–5.28)
SODIUM SERPL-SCNC: 137 MMOL/L (ref 136–145)
TSH SERPL DL<=0.05 MIU/L-ACNC: 2.01 UIU/ML (ref 0.27–4.2)
WBC NRBC COR # BLD: 3.34 10*3/MM3 (ref 3.4–10.8)

## 2022-04-25 PROCEDURE — 99395 PREV VISIT EST AGE 18-39: CPT | Performed by: PHYSICIAN ASSISTANT

## 2022-04-25 PROCEDURE — 0051A COVID-19 (PFIZER) 12+ YRS: CPT | Performed by: PHYSICIAN ASSISTANT

## 2022-04-25 PROCEDURE — 3008F BODY MASS INDEX DOCD: CPT | Performed by: PHYSICIAN ASSISTANT

## 2022-04-25 PROCEDURE — 80050 GENERAL HEALTH PANEL: CPT

## 2022-04-25 PROCEDURE — 86787 VARICELLA-ZOSTER ANTIBODY: CPT

## 2022-04-25 PROCEDURE — 2014F MENTAL STATUS ASSESS: CPT | Performed by: PHYSICIAN ASSISTANT

## 2022-04-25 PROCEDURE — 86735 MUMPS ANTIBODY: CPT

## 2022-04-25 PROCEDURE — 86706 HEP B SURFACE ANTIBODY: CPT

## 2022-04-25 PROCEDURE — 86480 TB TEST CELL IMMUN MEASURE: CPT

## 2022-04-25 PROCEDURE — 87340 HEPATITIS B SURFACE AG IA: CPT

## 2022-04-25 PROCEDURE — 86762 RUBELLA ANTIBODY: CPT

## 2022-04-25 PROCEDURE — 91305 COVID-19 (PFIZER) 12+ YRS: CPT | Performed by: PHYSICIAN ASSISTANT

## 2022-04-25 PROCEDURE — 86765 RUBEOLA ANTIBODY: CPT

## 2022-04-25 NOTE — PROGRESS NOTES
Reason for visit    Destiney Jha is a 26 y.o. female who presents for an initial visit with me.     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 Graduated in December from the [employer]. She is planning to start a 6-month internship in 2 months. She is engaged and her wedding is also scheduled for June.   She has a history of migraines around menstrual cycle. Imitrex is effective.     Diet/Physical activity:  -Reports healthy diet   -Weight training 4-5 times/week, cardio occasionally    Immunizations:  -Had childhood immunizations but can't find records  -Needing titers to begin internship  -She needs Tdap, influenza, and COVID vaccines    Cancer screening:   -No known family history of cancer   -No prior PAP smear    Depression: PHQ-2 Depression Screening  -Negative    Substance use:  -Denies tobacco, recreational drug use  -2-3 glasses of wine/week  -1-3 cups of coffee/espresso per week    Sexual health:  -She denies prior sexual activity  -LMP 3/31  -Regular periods  -Previously on OCPs for acne but did not like the way they made her feel    Dental/vision/skin:  -Current with dental and vision exams  -Denies new/changing/concerning skin lesions    Past Medical History:   Diagnosis Date   • Headache        History reviewed. No pertinent surgical history.    Family History   Problem Relation Age of Onset   • No Known Problems Mother    • No Known Problems Father    • Migraines Sister        Social History     Socioeconomic History   • Marital status: Single   Tobacco Use   • Smoking status: Never Smoker   • Smokeless tobacco: Never Used   Vaping Use   • Vaping Use: Never used   Substance and Sexual Activity   • Alcohol use: Yes     Comment: social    • Drug use: Never   • Sexual activity: Defer       Allergies   Allergen  Reactions   • Acetaminophen Nausea And Vomiting   • Aspirin Hives       ROS    Review of Systems   Constitutional: Negative.  Negative for appetite change, chills, diaphoresis, fatigue, fever, unexpected weight gain and unexpected weight loss.   HENT: Negative.  Negative for ear pain, hearing loss, nosebleeds, rhinorrhea, sore throat, trouble swallowing and voice change.    Eyes: Negative.  Negative for pain, redness, itching and visual disturbance.   Respiratory: Negative.  Negative for cough, shortness of breath and wheezing.    Cardiovascular: Negative.  Negative for chest pain, palpitations and leg swelling.   Gastrointestinal: Negative.  Negative for abdominal pain, blood in stool, constipation, diarrhea, nausea, vomiting and GERD.   Endocrine: Negative.  Negative for cold intolerance and heat intolerance.   Genitourinary: Negative.  Negative for dysuria, frequency, hematuria, urgency and urinary incontinence.   Musculoskeletal: Negative.  Negative for arthralgias, gait problem, joint swelling and myalgias.   Skin: Negative.  Negative for color change, rash and skin lesions.   Allergic/Immunologic: Negative.    Neurological: Negative.  Negative for dizziness, seizures, syncope, weakness, light-headedness and numbness.   Hematological: Negative.  Negative for adenopathy. Does not bruise/bleed easily.   Psychiatric/Behavioral: Negative.  Negative for behavioral problems, sleep disturbance, suicidal ideas and depressed mood. The patient is not nervous/anxious.         Vitals:    04/25/22 0808   BP: 118/72   Pulse: 95   Resp: 14   Temp: 98.2 °F (36.8 °C)   SpO2: 98%     Body mass index is 21.43 kg/m².      Current Outpatient Medications:   •  SUMAtriptan (IMITREX) 50 MG tablet, Take 1 tablet by mouth Every 2 (Two) Hours As Needed for Migraine. Max dose 200 mg per day, Disp: 9 tablet, Rfl: 5    PE    Physical Exam  Vitals reviewed.   Constitutional:       General: She is not in acute distress.     Appearance: Normal  appearance. She is well-developed.   HENT:      Head: Normocephalic and atraumatic.      Right Ear: Hearing, tympanic membrane and ear canal normal.      Left Ear: Hearing, tympanic membrane and ear canal normal.      Mouth/Throat:      Mouth: Mucous membranes are moist.      Dentition: Normal dentition.      Pharynx: Oropharynx is clear.   Eyes:      Conjunctiva/sclera: Conjunctivae normal.      Pupils: Pupils are equal, round, and reactive to light.   Neck:      Thyroid: No thyroid mass or thyromegaly.      Vascular: No carotid bruit.   Cardiovascular:      Rate and Rhythm: Normal rate and regular rhythm.      Heart sounds: Normal heart sounds, S1 normal and S2 normal. No murmur heard.  Pulmonary:      Effort: Pulmonary effort is normal.      Breath sounds: Normal breath sounds. No wheezing, rhonchi or rales.   Chest:   Breasts:      Right: No supraclavicular adenopathy.      Left: No supraclavicular adenopathy.        Comments: Defer breast exam  Abdominal:      General: Bowel sounds are normal. There is no abdominal bruit.      Palpations: Abdomen is soft. There is no mass.      Tenderness: There is no abdominal tenderness.   Genitourinary:     Comments: Deferred  Musculoskeletal:         General: Normal range of motion.      Cervical back: Normal range of motion and neck supple.   Lymphadenopathy:      Cervical: No cervical adenopathy.      Upper Body:      Right upper body: No supraclavicular adenopathy.      Left upper body: No supraclavicular adenopathy.   Skin:     General: Skin is warm and dry.      Findings: No rash.      Nails: There is no clubbing.      Comments: No suspicious nevi on clothed exam   Neurological:      Mental Status: She is alert.      Gait: Gait normal.      Deep Tendon Reflexes: Reflexes normal.   Psychiatric:         Speech: Speech normal.         Behavior: Behavior normal.         A/P    Problem List Items Addressed This Visit        Neuro    Migraine  -Surrounding period  -Continue  Imitrex prn    Relevant Medications    SUMAtriptan (IMITREX) 50 MG tablet      Other Visit Diagnoses     Preventative health care    -  Primary  -Counseled patient regarding cancer screening, immunizations, healthy lifestyle, diet, maintaining a healthy weight, and exercise.  -Annual dental, eye, and gynecologic exams were recommended.   -Patient declines PAP and gynecology referral today  -First COVID vaccine today. Follow-up nurse visits for influenza, Tdap and 2nd COVID vaccine  -Will complete forms for her internship when results are back  -RTC in 1 year for physical    Relevant Orders    CBC & Differential    Comprehensive Metabolic Panel    TSH Rfx On Abnormal To Free T4    Immunization counseling        Relevant Orders    Measles / Mumps / Rubella Immunity    Varicella zoster antibody, IgG    Hepatitis B Surface Antibody    Hepatitis B Surface Antigen    QuantiFERON TB Plus Client Incubated          Plan of care was reviewed with patient at the conclusion of today's visit. Education was provided regarding diagnoses, management, treatment plan, and the importance of keeping follow-up appointments. The patient was counseled regarding the risks, benefits, and possible side-effects of treatment. Patient and/or family expresses understanding and agreement with the management plan.        INESSA Morataya

## 2022-04-26 LAB
MEV IGG SER IA-ACNC: 51.5 AU/ML
MUV IGG SER IA-ACNC: 33.7 AU/ML
RUBV IGG SERPL IA-ACNC: 1.85 INDEX
VZV IGG SER IA-ACNC: 592 INDEX

## 2022-04-27 LAB
GAMMA INTERFERON BACKGROUND BLD IA-ACNC: 0.12 IU/ML
M TB IFN-G BLD-IMP: NEGATIVE
M TB IFN-G CD4+ BCKGRND COR BLD-ACNC: 0.08 IU/ML
M TB IFN-G CD4+CD8+ BCKGRND COR BLD-ACNC: 0.08 IU/ML
MITOGEN IGNF BLD-ACNC: >10 IU/ML
SERVICE CMNT-IMP: NORMAL

## 2022-04-29 ENCOUNTER — PATIENT MESSAGE (OUTPATIENT)
Dept: FAMILY MEDICINE CLINIC | Facility: CLINIC | Age: 27
End: 2022-04-29

## 2022-04-29 DIAGNOSIS — D70.9 NEUTROPENIA, UNSPECIFIED TYPE: Primary | ICD-10-CM

## 2022-05-05 ENCOUNTER — TELEPHONE (OUTPATIENT)
Dept: FAMILY MEDICINE CLINIC | Facility: CLINIC | Age: 27
End: 2022-05-05

## 2022-05-06 NOTE — TELEPHONE ENCOUNTER
Called and spoke to patient. Stated she is starting dose today and following instructions on how to take as noted on dose package. Had no further questions at this time.

## 2022-05-09 ENCOUNTER — OFFICE VISIT (OUTPATIENT)
Dept: FAMILY MEDICINE CLINIC | Facility: CLINIC | Age: 27
End: 2022-05-09

## 2022-05-09 VITALS
HEART RATE: 64 BPM | OXYGEN SATURATION: 99 % | DIASTOLIC BLOOD PRESSURE: 78 MMHG | WEIGHT: 130 LBS | BODY MASS INDEX: 21.66 KG/M2 | RESPIRATION RATE: 14 BRPM | SYSTOLIC BLOOD PRESSURE: 118 MMHG | TEMPERATURE: 97.3 F | HEIGHT: 65 IN

## 2022-05-09 DIAGNOSIS — R21 RASH AND NONSPECIFIC SKIN ERUPTION: Primary | ICD-10-CM

## 2022-05-09 PROCEDURE — 87254 VIRUS INOCULATION SHELL VIA: CPT | Performed by: PHYSICIAN ASSISTANT

## 2022-05-09 PROCEDURE — 87252 VIRUS INOCULATION TISSUE: CPT | Performed by: PHYSICIAN ASSISTANT

## 2022-05-09 PROCEDURE — 99214 OFFICE O/P EST MOD 30 MIN: CPT | Performed by: PHYSICIAN ASSISTANT

## 2022-05-09 RX ORDER — FLUOCINONIDE 1 MG/G
1 CREAM TOPICAL EVERY 12 HOURS SCHEDULED
Qty: 28 G | Refills: 0 | Status: SHIPPED | OUTPATIENT
Start: 2022-05-09 | End: 2022-05-13

## 2022-05-09 NOTE — PROGRESS NOTES
"    Chief Complaint   Patient presents with   • Rash     On waist x 4 days and right arm x 1 day       HPI     Destiney Jha is a pleasant 26 y.o. female who presents for evaluation of \"chief complaint.\"     She c/o a right abdominal rash that started 5 days ago. She states initially the area looked like bug bites.  She denies numbness and tingling but reports a history of shingles on her left abdomen in the past. She admits to sunbathing in the grass earlier in the week before her rash appeared. Denies new medications, topical skin products, soaps, detergents. She was started on a medrol dose pack by [facility] 4 days ago.     Past Medical History:   Diagnosis Date   • Headache        History reviewed. No pertinent surgical history.    Family History   Problem Relation Age of Onset   • No Known Problems Mother    • No Known Problems Father    • Migraines Sister        Social History     Socioeconomic History   • Marital status: Single   Tobacco Use   • Smoking status: Never Smoker   • Smokeless tobacco: Never Used   Vaping Use   • Vaping Use: Never used   Substance and Sexual Activity   • Alcohol use: Yes     Comment: social    • Drug use: Never   • Sexual activity: Defer       Allergies   Allergen Reactions   • Acetaminophen Nausea And Vomiting   • Aspirin Hives       ROS    Review of Systems   Skin: Positive for rash.       Vitals:    05/09/22 1103   BP: 118/78   Pulse: 64   Resp: 14   Temp: 97.3 °F (36.3 °C)   SpO2: 99%     Body mass index is 21.63 kg/m².      Current Outpatient Medications:   •  SUMAtriptan (IMITREX) 50 MG tablet, Take 1 tablet by mouth Every 2 (Two) Hours As Needed for Migraine. Max dose 200 mg per day, Disp: 9 tablet, Rfl: 5  •  fluocinonide 0.1 % cream cream, Apply 1 application topically to the appropriate area as directed Every 12 (Twelve) Hours for 14 days., Disp: 28 g, Rfl: 0    PE    Physical Exam  Vitals reviewed.   Constitutional:       General: She is not in acute distress.  Pulmonary:      " Effort: Pulmonary effort is normal. No respiratory distress.   Skin:            Comments: Large erythematous raised patch right flank. There is another vertical linear patch inferior to this. Few erythematous satellite papules right abdomen, right upper arm and forearm and left proximal thigh   Neurological:      Mental Status: She is alert.   Psychiatric:         Mood and Affect: Mood normal.          A/P    Problem List Items Addressed This Visit    None     Visit Diagnoses     Rash and nonspecific skin eruption    -  Primary  -Suspect contact dermatitis based on presentation  -Complete medrol dose pack and add high potency topical steroid bid x 14 days. Advised patient to avoid applying steroids to face and skin creases.   -Cultured to r/o zoster    Relevant Medications    fluocinonide 0.1 % cream cream    Other Relevant Orders    Varicella Zoster Culture - Scrapings, Abdomen          Plan of care was reviewed with patient at the conclusion of today's visit. Education was provided regarding diagnoses, management, prescribed or recommended OTC products, and the importance of compliance with follow-up appointments. The patient was counseled regarding the risks, benefits, and possible side-effects of treatment. I advised the patient to keep me informed of any acute changes in their status including new, worsening, or persistent symptoms. Patient expresses understanding and agreement with the management plan.        INESSA Morataya

## 2022-05-10 RX ORDER — FLUOCINONIDE 1 MG/G
1 CREAM TOPICAL EVERY 12 HOURS SCHEDULED
Qty: 28 G | Refills: 0 | Status: CANCELLED | OUTPATIENT
Start: 2022-05-10 | End: 2022-05-24

## 2022-05-10 NOTE — TELEPHONE ENCOUNTER
Caller: Destiney Jha    Relationship: Self    Best call back number:[phone]    Requested Prescriptions:   Requested Prescriptions     Pending Prescriptions Disp Refills   • fluocinonide 0.1 % cream cream 28 g 0     Sig: Apply 1 application topically to the appropriate area as directed Every 12 (Twelve) Hours for 14 days.        Pharmacy where request should be sent: [id number] - [place], [place] - [address] AT [facility] [address] - [phone]  - [phone] FX     Additional details provided by patient:   PATIENT WAS INFORMED BY THE PHARMACY THAT THIS MEDICATION NEEDS A PRIOR AUTHORIZATION FOR INSURANCE TO COVER     Does the patient have less than a 3 day supply:  [x] Yes  [] No    Daquan Andino Rep   05/10/22 10:30 EDT

## 2022-05-12 ENCOUNTER — PRIOR AUTHORIZATION (OUTPATIENT)
Dept: FAMILY MEDICINE CLINIC | Facility: CLINIC | Age: 27
End: 2022-05-12

## 2022-05-12 ENCOUNTER — TELEPHONE (OUTPATIENT)
Dept: FAMILY MEDICINE CLINIC | Facility: CLINIC | Age: 27
End: 2022-05-12

## 2022-05-12 NOTE — TELEPHONE ENCOUNTER
Called and spoke to patient. Stated rash on waist is drying out and patient is now getting more bumps/rash. Notices rash now on inner thighs, foot and just seems to be spreading on R arm. Stated if she scratches it seems like two or three more spots will appear the next day. Stated she was prescribed a cream that needs prior authorization which was just completed and sent to plan today. Patient states she has been putting coconut oil, aloe leaf and cold compresses which seems to help a little. But over all seems to be getting worse and spreading. Reports trying and failing Cortizone cream. Stated it caused a reaction. Denies rash being on face or near mouth. Denies fever.  Please advise, thanks

## 2022-05-12 NOTE — TELEPHONE ENCOUNTER
Patient called back checking the status of this medication request / prior authorization request. Patient is out of medication. Please advise.

## 2022-05-12 NOTE — TELEPHONE ENCOUNTER
Caller: Destiney Jha    Relationship: Self    Best call back number: [phone]     What was the call regarding: PATIENT CALLED STATING THAT HER RASH IS HEALING BUT SHE KEEPS GETTING RASH SPOTS IN OTHER LOCATIONS.  PATIENT ASKED IF SHE NEEDED TO COME IN TO BE SEEN.    Do you require a callback: YES

## 2022-05-12 NOTE — TELEPHONE ENCOUNTER
Prior Authorization for  Fluocinonide 0.1% cream  completed and sent to plan via Covermymeds. Status pending;   KEY:[id number]  PA Case ID:[id number]

## 2022-05-13 RX ORDER — CLOBETASOL PROPIONATE 0.5 MG/G
1 CREAM TOPICAL 2 TIMES DAILY
Qty: 30 G | Refills: 0 | Status: SHIPPED | OUTPATIENT
Start: 2022-05-13 | End: 2022-05-16

## 2022-05-13 RX ORDER — HYDROXYZINE HYDROCHLORIDE 25 MG/1
25 TABLET, FILM COATED ORAL EVERY 8 HOURS PRN
Qty: 30 TABLET | Refills: 0 | Status: SHIPPED | OUTPATIENT
Start: 2022-05-13

## 2022-05-13 NOTE — TELEPHONE ENCOUNTER
Outcome received that prior auth for  Fluocinonide 0.1%cream  , was denied by pt's plan. Denial Letter placed in patient's chart.

## 2022-05-13 NOTE — TELEPHONE ENCOUNTER
I spoke with the patient over the phone and reassured her that new spots appearing is typical of contact dermatitis. I offered to send her in a prescription for oral prednisone but she would like to hold off at this time and use the topical steroid if she can get it. I recommend calling her pharmacy back later on today to see if she can get the prescription.  In the meantime, I will send in some hydroxyzine to help with itching. Answered all questions.

## 2022-05-16 ENCOUNTER — OFFICE VISIT (OUTPATIENT)
Dept: FAMILY MEDICINE CLINIC | Facility: CLINIC | Age: 27
End: 2022-05-16

## 2022-05-16 VITALS
HEART RATE: 86 BPM | WEIGHT: 129.8 LBS | OXYGEN SATURATION: 98 % | DIASTOLIC BLOOD PRESSURE: 76 MMHG | SYSTOLIC BLOOD PRESSURE: 118 MMHG | BODY MASS INDEX: 21.63 KG/M2 | HEIGHT: 65 IN

## 2022-05-16 DIAGNOSIS — R21 RASH AND NONSPECIFIC SKIN ERUPTION: Primary | ICD-10-CM

## 2022-05-16 PROCEDURE — 99213 OFFICE O/P EST LOW 20 MIN: CPT | Performed by: PHYSICIAN ASSISTANT

## 2022-05-16 NOTE — PROGRESS NOTES
Chief Complaint   Patient presents with   • Rash     All over body, it is itchy and painful       HPI     Destiney Jha is a 26 y.o. female who is here for evaluation of rash.     Patient was seen here on 5/9/2022 for abdominal rash and suspected contact dermatitis after sunbathing in the grass several days before her rash appeared. She was completing a steroid pack from urgent care and was prescribed steroid cream. She has been unable to  the steroid cream from her pharmacy despite calling several times. She has some new places that are appearing on her arms, chest, and legs. She denies facial lesions, throat/tongue/lip swelling, difficulty breathing.     Past Medical History:   Diagnosis Date   • Headache        History reviewed. No pertinent surgical history.    Family History   Problem Relation Age of Onset   • No Known Problems Mother    • No Known Problems Father    • Migraines Sister        Social History     Socioeconomic History   • Marital status: Single   Tobacco Use   • Smoking status: Never Smoker   • Smokeless tobacco: Never Used   Vaping Use   • Vaping Use: Never used   Substance and Sexual Activity   • Alcohol use: Yes     Comment: social    • Drug use: Never   • Sexual activity: Defer       Allergies   Allergen Reactions   • Acetaminophen Nausea And Vomiting   • Aspirin Hives       ROS    Review of Systems   Respiratory: Negative for shortness of breath.    Skin: Positive for rash.       Vitals:    05/16/22 1233   BP: 118/76   Pulse: 86   SpO2: 98%     Body mass index is 21.6 kg/m².      Current Outpatient Medications:   •  hydrOXYzine (ATARAX) 25 MG tablet, Take 1 tablet by mouth Every 8 (Eight) Hours As Needed for Itching., Disp: 30 tablet, Rfl: 0  •  SUMAtriptan (IMITREX) 50 MG tablet, Take 1 tablet by mouth Every 2 (Two) Hours As Needed for Migraine. Max dose 200 mg per day, Disp: 9 tablet, Rfl: 5    PE    Physical Exam  Vitals reviewed.   Constitutional:       General: She is not in  acute distress.  Pulmonary:      Effort: Pulmonary effort is normal. No respiratory distress.   Skin:            Comments: Patchy erythematous maculopapular rash, confluent in some larger areas around the right abdomen and extending down the flank onto the buttock. Scattered erythematous papules on right arm and both legs   Neurological:      Mental Status: She is alert.   Psychiatric:         Mood and Affect: Mood normal.         Results    Results for orders placed or performed in visit on 05/09/22   Varicella Zoster Culture - Scrapings, Abdomen    Specimen: Abdomen; Scrapings   Result Value Ref Range    Varicella-Zoster Virus Culture Comment        A/P    Problem List Items Addressed This Visit    None     Visit Diagnoses     Rash and nonspecific skin eruption    -  Primary  -Viral culture was obtained last visit due to initial rash on right flank.  This was negative for herpes zoster  -Now with more scattered widespread rash consistent with contact dermatitis.  We discussed that the new areas are most likely due to secondary exposures from clothing or pets  -Counseled patient to wash clothes that she wore the day of exposure as well as her dog who was with her  -Initially Lidex was not covered by her insurance.  I called her pharmacy and it appears she should be able to  clobetasol today. 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 Education was provided regarding diagnoses, management, and the importance of keeping follow-up appointments. The patient was counseled regarding the risks, benefits, and possible side-effects of treatment. Patient and/or family express  understanding and agreement with the management plan.        INESSA Morataya

## 2022-05-18 ENCOUNTER — IMMUNIZATION (OUTPATIENT)
Dept: FAMILY MEDICINE CLINIC | Facility: CLINIC | Age: 27
End: 2022-05-18

## 2022-05-18 DIAGNOSIS — Z23 IMMUNIZATION DUE: Primary | ICD-10-CM

## 2022-05-18 PROCEDURE — 91305 COVID-19 (PFIZER) 12+ YRS: CPT | Performed by: PHYSICIAN ASSISTANT

## 2022-05-18 PROCEDURE — 0052A COVID-19 (PFIZER) 12+ YRS: CPT | Performed by: PHYSICIAN ASSISTANT

## 2022-05-19 LAB — VZV SPEC QL CULT: NORMAL

## 2022-06-01 ENCOUNTER — CLINICAL SUPPORT (OUTPATIENT)
Dept: FAMILY MEDICINE CLINIC | Facility: CLINIC | Age: 27
End: 2022-06-01

## 2022-06-01 DIAGNOSIS — Z23 IMMUNIZATION DUE: Primary | ICD-10-CM

## 2022-06-01 PROCEDURE — 90715 TDAP VACCINE 7 YRS/> IM: CPT | Performed by: PHYSICIAN ASSISTANT

## 2022-06-01 PROCEDURE — 90471 IMMUNIZATION ADMIN: CPT | Performed by: PHYSICIAN ASSISTANT

## 2022-10-18 DIAGNOSIS — G43.109 MIGRAINE WITH AURA AND WITHOUT STATUS MIGRAINOSUS, NOT INTRACTABLE: ICD-10-CM

## 2022-10-20 ENCOUNTER — PATIENT MESSAGE (OUTPATIENT)
Dept: FAMILY MEDICINE CLINIC | Facility: CLINIC | Age: 27
End: 2022-10-20

## 2022-10-20 RX ORDER — SUMATRIPTAN 50 MG/1
TABLET, FILM COATED ORAL
Qty: 9 TABLET | Refills: 5 | Status: SHIPPED | OUTPATIENT
Start: 2022-10-20

## 2022-11-01 ENCOUNTER — PRIOR AUTHORIZATION (OUTPATIENT)
Dept: FAMILY MEDICINE CLINIC | Facility: CLINIC | Age: 27
End: 2022-11-01

## 2024-01-12 DIAGNOSIS — G43.109 MIGRAINE WITH AURA AND WITHOUT STATUS MIGRAINOSUS, NOT INTRACTABLE: ICD-10-CM

## 2024-01-12 RX ORDER — SUMATRIPTAN 50 MG/1
TABLET, FILM COATED ORAL
Qty: 9 TABLET | Refills: 5 | OUTPATIENT
Start: 2024-01-12

## 2024-01-12 NOTE — TELEPHONE ENCOUNTER
Rx Refill Note  Requested Prescriptions     Pending Prescriptions Disp Refills    SUMAtriptan (IMITREX) 50 MG tablet [Pharmacy Med Name: SUMATRIPTAN 50MG TABLETS] 9 tablet 5     Sig: TAKE 1 TABLET BY MOUTH EVERY 2 HOURS AS NEEDED FOR MIGRAINE. MAX DOSE 200MG PER DAY      Last office visit with prescribing clinician: 5/16/2022   Last telemedicine visit with prescribing clinician: Visit date not found   Next office visit with prescribing clinician: Visit date not found                         Would you like a call back once the refill request has been completed: [] Yes [] No    If the office needs to give you a call back, can they leave a voicemail: [] Yes [] No    Mi March MA  01/12/24, 14:59 EST

## 2024-02-02 ENCOUNTER — LAB (OUTPATIENT)
Dept: LAB | Facility: HOSPITAL | Age: 29
End: 2024-02-02
Payer: COMMERCIAL

## 2024-02-02 ENCOUNTER — OFFICE VISIT (OUTPATIENT)
Dept: FAMILY MEDICINE CLINIC | Facility: CLINIC | Age: 29
End: 2024-02-02
Payer: COMMERCIAL

## 2024-02-02 VITALS
OXYGEN SATURATION: 99 % | HEART RATE: 71 BPM | SYSTOLIC BLOOD PRESSURE: 124 MMHG | DIASTOLIC BLOOD PRESSURE: 66 MMHG | WEIGHT: 133.2 LBS | BODY MASS INDEX: 22.19 KG/M2 | HEIGHT: 65 IN

## 2024-02-02 DIAGNOSIS — G43.109 MIGRAINE WITH AURA AND WITHOUT STATUS MIGRAINOSUS, NOT INTRACTABLE: ICD-10-CM

## 2024-02-02 DIAGNOSIS — Z00.00 HEALTHCARE MAINTENANCE: Primary | ICD-10-CM

## 2024-02-02 PROCEDURE — 82306 VITAMIN D 25 HYDROXY: CPT | Performed by: PHYSICIAN ASSISTANT

## 2024-02-02 PROCEDURE — 80050 GENERAL HEALTH PANEL: CPT | Performed by: PHYSICIAN ASSISTANT

## 2024-02-02 PROCEDURE — 86803 HEPATITIS C AB TEST: CPT | Performed by: PHYSICIAN ASSISTANT

## 2024-02-02 RX ORDER — SUMATRIPTAN 100 MG/1
TABLET, FILM COATED ORAL
Qty: 9 TABLET | Refills: 5 | Status: SHIPPED | OUTPATIENT
Start: 2024-02-02

## 2024-02-02 NOTE — PROGRESS NOTES
Reason for visit    Destiney Jha is a 28 y.o. female who presents for annual comprehensive exam.    Chief Complaint   Patient presents with    Annual Exam     Medication refills        HPI     Here for physical.   Working as dietician currently.  Having more frequent migraines once weekly. She feels this is related to some stress at work. Also around menstrual cycle. Sometimes they last 2 days. She tolerates imitrex well but sometimes it does not work. Asking to increase her dose.     Diet/Physical activity:  -She reports a healthy diet.   -She exercises almost every day. Weight training, cardio (walking) for 15-30 minutes 2-3 times per week.     Immunizations:  -Declines influenza and updated COVID vaccines.   -Believes she had Gardasil and all her childhood immunizations.     Cancer screening:   -Positive Fhx: ovarian cancer (paternal aunt).  -Negative Fhx: colon, breast cancer, colon polyps.   -PAP smear last week with her gynecologist (T[facility]) - negative.     PHQ-9 Depression Screening  Little interest or pleasure in doing things? 0-->not at all   Feeling down, depressed, or hopeless? 0-->not at all   Trouble falling or staying asleep, or sleeping too much?     Feeling tired or having little energy?     Poor appetite or overeating?     Feeling bad about yourself - or that you are a failure or have let yourself or your family down?     Trouble concentrating on things, such as reading the newspaper or watching television?     Moving or speaking so slowly that other people could have noticed? Or the opposite - being so fidgety or restless that you have been moving around a lot more than usual?     Thoughts that you would be better off dead, or of hurting yourself in some way?     PHQ-9 Total Score 0   If you checked off any problems, how difficult have these problems made it for you to do your work, take care of things at home, or get along with other people?           Substance use:  -Denies tobacco, recreational  drug use.  -Occasional alcohol use, twice monthly.   -2-4 shots of espresso/day.     Sexual health:  - x 1.5 years.  -No contraceptive.   -Normal periods month, 5-7 days, LMP 3 weeks ago.     Dental/vision/skin:  -Current with dental/eye exams.   -Denies new/changing/concerning skin lesions.     Past Medical History:   Diagnosis Date    Headache        No past surgical history on file.    Family History   Problem Relation Age of Onset    No Known Problems Mother     No Known Problems Father     Migraines Sister        Social History     Socioeconomic History    Marital status: Single   Tobacco Use    Smoking status: Never    Smokeless tobacco: Never   Vaping Use    Vaping Use: Never used   Substance and Sexual Activity    Alcohol use: Not Currently     Comment: social     Drug use: Never    Sexual activity: Yes     Partners: Male     Birth control/protection: None       No Known Allergies    ROS    Review of Systems   Constitutional:  Negative for appetite change, chills, diaphoresis, fatigue, fever and unexpected weight loss.   HENT:  Negative for congestion, hearing loss, sore throat, swollen glands and trouble swallowing.    Eyes:  Negative for blurred vision and visual disturbance.   Respiratory:  Negative for cough and shortness of breath.    Cardiovascular:  Negative for chest pain.   Gastrointestinal:  Negative for abdominal pain, blood in stool, constipation, diarrhea and GERD.   Endocrine: Negative for polydipsia and polyuria.   Genitourinary:  Negative for breast lump, breast pain, difficulty urinating, dysuria, pelvic pain and pelvic pressure.   Musculoskeletal:  Negative for arthralgias and myalgias.   Skin:  Negative for rash and skin lesions.   Neurological:  Positive for headache. Negative for dizziness and numbness.   Hematological:  Negative for adenopathy.   Psychiatric/Behavioral:  Negative for sleep disturbance and depressed mood. The patient is not nervous/anxious.        Vitals:     02/02/24 1402   BP: 124/66   Pulse: 71   SpO2: 99%     Body mass index is 22.17 kg/m².      Current Outpatient Medications:     SUMAtriptan (IMITREX) 100 MG tablet, Take one tablet at onset of headache. May repeat dose one time in 2 hours if headache not relieved., Disp: 9 tablet, Rfl: 5    cholecalciferol (VITAMIN D3) 25 MCG (1000 UT) tablet, Take 1 tablet by mouth Daily., Disp: 90 tablet, Rfl: 3    PE    Physical Exam  Vitals reviewed.   Constitutional:       General: She is not in acute distress.     Appearance: Normal appearance. She is well-developed.   HENT:      Head: Normocephalic and atraumatic.      Right Ear: Hearing, tympanic membrane and ear canal normal.      Left Ear: Hearing, tympanic membrane and ear canal normal.      Mouth/Throat:      Mouth: Mucous membranes are moist.      Dentition: Normal dentition.      Pharynx: Oropharynx is clear.   Eyes:      Conjunctiva/sclera: Conjunctivae normal.      Pupils: Pupils are equal, round, and reactive to light.   Neck:      Thyroid: No thyroid mass or thyromegaly.      Vascular: No carotid bruit.   Cardiovascular:      Rate and Rhythm: Normal rate and regular rhythm.      Heart sounds: Normal heart sounds, S1 normal and S2 normal. No murmur heard.  Pulmonary:      Effort: Pulmonary effort is normal.      Breath sounds: Normal breath sounds.   Abdominal:      General: Bowel sounds are normal. There is no abdominal bruit.      Palpations: Abdomen is soft. There is no mass.      Tenderness: There is no abdominal tenderness.   Musculoskeletal:         General: Normal range of motion.      Cervical back: Normal range of motion and neck supple.   Lymphadenopathy:      Cervical: No cervical adenopathy.      Upper Body:      Right upper body: No supraclavicular adenopathy.      Left upper body: No supraclavicular adenopathy.   Skin:     General: Skin is warm and dry.      Findings: No rash.      Nails: There is no clubbing.      Comments: No suspicious nevi on  clothed exam.    Neurological:      Mental Status: She is alert.      Gait: Gait normal.      Deep Tendon Reflexes: Reflexes normal.   Psychiatric:         Speech: Speech normal.         Behavior: Behavior normal.         A/P    Problem List Items Addressed This Visit          Health Encounters    Healthcare maintenance - Primary    Current Assessment & Plan     -Counseled patient regarding cancer screening, immunizations, healthy lifestyle, diet, maintaining a healthy weight, and exercise.  -Annual dental, eye, and gynecologic exams were recommended.    -Check screening labs as ordered.   -RTC in 1 year for annual physical, sooner prn.          Relevant Orders    CBC (No Diff) (Completed)    Comprehensive Metabolic Panel (Completed)    TSH Rfx On Abnormal To Free T4 (Completed)    Hepatitis C Antibody (Completed)    Vitamin D,25-Hydroxy (Completed)       Neuro    Migraine    Current Assessment & Plan     Increase dose of Imitrex to 100 mg.              Relevant Medications    SUMAtriptan (IMITREX) 100 MG tablet       Plan of care was reviewed with patient at the conclusion of today's visit. Education was provided regarding diagnoses, management, treatment plan, and the importance of keeping follow-up appointments. The patient was counseled regarding the risks, benefits, and possible side-effects of treatment. Patient and/or family expresses understanding and agreement with the management plan.        Mike Avila PA-C

## 2024-02-03 LAB
25(OH)D3 SERPL-MCNC: 29.6 NG/ML (ref 30–100)
ALBUMIN SERPL-MCNC: 4.7 G/DL (ref 3.5–5.2)
ALBUMIN/GLOB SERPL: 2.4 G/DL
ALP SERPL-CCNC: 53 U/L (ref 39–117)
ALT SERPL W P-5'-P-CCNC: 13 U/L (ref 1–33)
ANION GAP SERPL CALCULATED.3IONS-SCNC: 12 MMOL/L (ref 5–15)
AST SERPL-CCNC: 16 U/L (ref 1–32)
BILIRUB SERPL-MCNC: 0.4 MG/DL (ref 0–1.2)
BUN SERPL-MCNC: 12 MG/DL (ref 6–20)
BUN/CREAT SERPL: 14.8 (ref 7–25)
CALCIUM SPEC-SCNC: 9 MG/DL (ref 8.6–10.5)
CHLORIDE SERPL-SCNC: 103 MMOL/L (ref 98–107)
CO2 SERPL-SCNC: 23 MMOL/L (ref 22–29)
CREAT SERPL-MCNC: 0.81 MG/DL (ref 0.57–1)
DEPRECATED RDW RBC AUTO: 39.5 FL (ref 37–54)
EGFRCR SERPLBLD CKD-EPI 2021: 102.8 ML/MIN/1.73
ERYTHROCYTE [DISTWIDTH] IN BLOOD BY AUTOMATED COUNT: 11.7 % (ref 12.3–15.4)
GLOBULIN UR ELPH-MCNC: 2 GM/DL
GLUCOSE SERPL-MCNC: 80 MG/DL (ref 65–99)
HCT VFR BLD AUTO: 38.6 % (ref 34–46.6)
HCV AB SER DONR QL: NORMAL
HGB BLD-MCNC: 12.7 G/DL (ref 12–15.9)
MCH RBC QN AUTO: 31 PG (ref 26.6–33)
MCHC RBC AUTO-ENTMCNC: 32.9 G/DL (ref 31.5–35.7)
MCV RBC AUTO: 94.1 FL (ref 79–97)
PLATELET # BLD AUTO: 265 10*3/MM3 (ref 140–450)
PMV BLD AUTO: 11.4 FL (ref 6–12)
POTASSIUM SERPL-SCNC: 3.7 MMOL/L (ref 3.5–5.2)
PROT SERPL-MCNC: 6.7 G/DL (ref 6–8.5)
RBC # BLD AUTO: 4.1 10*6/MM3 (ref 3.77–5.28)
SODIUM SERPL-SCNC: 138 MMOL/L (ref 136–145)
TSH SERPL DL<=0.05 MIU/L-ACNC: 1.22 UIU/ML (ref 0.27–4.2)
WBC NRBC COR # BLD AUTO: 5.79 10*3/MM3 (ref 3.4–10.8)

## 2024-02-05 RX ORDER — MELATONIN
1000 DAILY
Qty: 90 TABLET | Refills: 3 | Status: SHIPPED | OUTPATIENT
Start: 2024-02-05

## 2024-02-09 PROBLEM — Z00.00 HEALTHCARE MAINTENANCE: Status: ACTIVE | Noted: 2024-02-09

## 2024-02-09 NOTE — ASSESSMENT & PLAN NOTE
-Counseled patient regarding cancer screening, immunizations, healthy lifestyle, diet, maintaining a healthy weight, and exercise.  -Annual dental, eye, and gynecologic exams were recommended.    -Check screening labs as ordered.   -RTC in 1 year for annual physical, sooner prn.

## 2024-04-29 ENCOUNTER — OFFICE VISIT (OUTPATIENT)
Dept: FAMILY MEDICINE CLINIC | Facility: CLINIC | Age: 29
End: 2024-04-29
Payer: COMMERCIAL

## 2024-04-29 ENCOUNTER — HOSPITAL ENCOUNTER (OUTPATIENT)
Dept: RADIOLOGY | Facility: CLINIC | Age: 29
Discharge: HOME | End: 2024-04-29
Payer: COMMERCIAL

## 2024-04-29 VITALS
SYSTOLIC BLOOD PRESSURE: 126 MMHG | BODY MASS INDEX: 21.02 KG/M2 | HEIGHT: 65 IN | WEIGHT: 126.2 LBS | HEART RATE: 80 BPM | OXYGEN SATURATION: 100 % | DIASTOLIC BLOOD PRESSURE: 62 MMHG

## 2024-04-29 DIAGNOSIS — F43.22 ADJUSTMENT REACTION WITH ANXIETY: ICD-10-CM

## 2024-04-29 DIAGNOSIS — M54.6 ACUTE BILATERAL THORACIC BACK PAIN: ICD-10-CM

## 2024-04-29 DIAGNOSIS — M62.838 SPASM OF CERVICAL PARASPINOUS MUSCLE: ICD-10-CM
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
## 2024-04-29 DIAGNOSIS — M54.2 ACUTE NECK PAIN: ICD-10-CM

## 2024-04-29 PROCEDURE — 72072 X-RAY EXAM THORAC SPINE 3VWS: CPT | Performed by: PHYSICIAN ASSISTANT

## 2024-04-29 PROCEDURE — 72040 X-RAY EXAM NECK SPINE 2-3 VW: CPT | Performed by: PHYSICIAN ASSISTANT

## 2024-04-29 PROCEDURE — 99214 OFFICE O/P EST MOD 30 MIN: CPT | Performed by: PHYSICIAN ASSISTANT

## 2024-04-29 RX ORDER — METHOCARBAMOL 500 MG/1
500 TABLET, FILM COATED ORAL 3 TIMES DAILY PRN
Qty: 30 TABLET | Refills: 0 | Status: SHIPPED | OUTPATIENT
Start: 2024-04-29

## 2024-04-29 RX ORDER — MELOXICAM 15 MG/1
15 TABLET ORAL DAILY
Qty: 14 TABLET | Refills: 0 | Status: SHIPPED | OUTPATIENT
Start: 2024-04-29 | End: 2024-05-13
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
## 2024-04-29 NOTE — LETTER
April 29, 2024     Patient: Destiney Jha   YOB: 1995   Date of Visit: 4/29/2024       To Whom It May Concern:    It is my medical opinion that Destiney Jha may return to work on 5/6/2024 unless otherwise instructed.          Sincerely,        Mike Avila PA-C    CC: No Recipients

## 2024-05-06 ENCOUNTER — PATIENT MESSAGE (OUTPATIENT)
Dept: FAMILY MEDICINE CLINIC | Facility: CLINIC | Age: 29
End: 2024-05-06
Payer: COMMERCIAL
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
## 2024-05-06 DIAGNOSIS — M54.2 ACUTE NECK PAIN: ICD-10-CM

## 2024-05-06 DIAGNOSIS — M54.6 ACUTE BILATERAL THORACIC BACK PAIN: ICD-10-CM

## 2024-05-07 ENCOUNTER — TELEPHONE (OUTPATIENT)
Dept: FAMILY MEDICINE CLINIC | Facility: CLINIC | Age: 29
End: 2024-05-07
Payer: COMMERCIAL

## 2024-05-07 RX ORDER — METHYLPREDNISOLONE 4 MG/1
TABLET ORAL
Qty: 21 TABLET | Refills: 0 | Status: SHIPPED | OUTPATIENT
Start: 2024-05-07